# Patient Record
(demographics unavailable — no encounter records)

---

## 2025-07-23 NOTE — END OF VISIT
[FreeTextEntry3] :  I, Dr. Sanches, personally performed the evaluation and management (E/M) services for this established patient who presents today with (a) new problem(s)/exacerbation of (an) existing condition(s). That E/M includes conducting the examination, assessing all new/exacerbated conditions, and establishing a new plan of care. Today, my ACP, Laura Amor, was here to observe my evaluation and management services for this new problem/exacerbated condition to be followed going forward.

## 2025-07-23 NOTE — CONSULT LETTER
[Dear  ___] : Dear  [unfilled], [Consult Letter:] : I had the pleasure of evaluating your patient, [unfilled]. [Please see my note below.] : Please see my note below. [Consult Closing:] : Thank you very much for allowing me to participate in the care of this patient.  If you have any questions, please do not hesitate to contact me. [Sincerely,] : Sincerely, [FreeTextEntry3] : Laura Amor [facility], Liver Disease and Nutrition Evelin Quispe [facility] [phone]

## 2025-07-23 NOTE — HISTORY OF PRESENT ILLNESS
[de-identified] : 10year old male with several month history of abdominal pain and constipation here for follow up evaluation.   Jay was last seen by Dr. Sanches in April 2025.  Symptoms likely due to Functional Gastrointestinal Disease/functional constipation. Recommended daily MiraLAX use: 2 capfuls per day for 2 days, then 1 capful per day after school.  Jay comes in today for follow up evaluation. He takes MiraLAX 1 capful daily- for the past week QOD.  BM's are currently QD-Q 2 days, #4 on the [place] scale.  No straining. No gross blood. There are no c/o abdominal pain. He is eating well, decent variety, Weight is stable.    No recent illnesses.

## 2025-07-23 NOTE — REASON FOR VISIT
[Consultation Follow Up] : a consultation follow up  [Mother] : mother [Consultation] : a consultation visit

## 2025-07-23 NOTE — ASSESSMENT
[Educated Patient & Family about Diagnosis] : educated the patient and family about the diagnosis [FreeTextEntry1] : Thriving 10-year-old with several month history of abdominal pain and constipation without "red-flag" symptoms such as diarrhea, hematochezia, vomiting, weight loss, loss of appetite, systemic or nocturnal symptoms.  Blood work 12/2024 unremarkable. Symptoms resolved on osmotic laxative.   PLAN: -Instructions given on transitioning to fiber supplement -follow up office visit PRN